# Patient Record
Sex: FEMALE | Race: WHITE | HISPANIC OR LATINO | Employment: FULL TIME | URBAN - METROPOLITAN AREA
[De-identification: names, ages, dates, MRNs, and addresses within clinical notes are randomized per-mention and may not be internally consistent; named-entity substitution may affect disease eponyms.]

---

## 2021-10-21 ENCOUNTER — OFFICE VISIT (OUTPATIENT)
Dept: OCCUPATIONAL MEDICINE | Facility: CLINIC | Age: 51
End: 2021-10-21

## 2021-10-21 DIAGNOSIS — Z02.1 PRE-EMPLOYMENT HEALTH SCREENING EXAMINATION: ICD-10-CM

## 2021-10-21 PROCEDURE — 8915 PR COMPREHENSIVE PHYSICAL: Performed by: NURSE PRACTITIONER

## 2022-02-15 ENCOUNTER — APPOINTMENT (RX ONLY)
Dept: URBAN - METROPOLITAN AREA CLINIC 4 | Facility: CLINIC | Age: 52
Setting detail: DERMATOLOGY
End: 2022-02-15

## 2022-02-15 DIAGNOSIS — Z71.89 OTHER SPECIFIED COUNSELING: ICD-10-CM

## 2022-02-15 DIAGNOSIS — L72.8 OTHER FOLLICULAR CYSTS OF THE SKIN AND SUBCUTANEOUS TISSUE: ICD-10-CM

## 2022-02-15 PROCEDURE — ? COUNSELING

## 2022-02-15 PROCEDURE — 99202 OFFICE O/P NEW SF 15 MIN: CPT

## 2022-02-15 PROCEDURE — ? OBSERVATION AND MEASURE

## 2022-02-15 PROCEDURE — ? DIAGNOSIS COMMENT

## 2022-02-15 ASSESSMENT — LOCATION SIMPLE DESCRIPTION DERM
LOCATION SIMPLE: NECK
LOCATION SIMPLE: RIGHT UPPER BACK

## 2022-02-15 ASSESSMENT — LOCATION ZONE DERM
LOCATION ZONE: NECK
LOCATION ZONE: TRUNK

## 2022-02-15 ASSESSMENT — LOCATION DETAILED DESCRIPTION DERM
LOCATION DETAILED: RIGHT SUPERIOR LATERAL NECK
LOCATION DETAILED: RIGHT MEDIAL UPPER BACK

## 2022-02-15 NOTE — PROCEDURE: DIAGNOSIS COMMENT
Comment: Will run PA for excision with Dr. Malik, codes: [id number], [id number], [id number].
Render Risk Assessment In Note?: no
Detail Level: Simple

## 2022-03-15 ENCOUNTER — APPOINTMENT (RX ONLY)
Dept: URBAN - METROPOLITAN AREA CLINIC 4 | Facility: CLINIC | Age: 52
Setting detail: DERMATOLOGY
End: 2022-03-15